# Patient Record
Sex: MALE | Race: BLACK OR AFRICAN AMERICAN | Employment: FULL TIME | URBAN - METROPOLITAN AREA
[De-identification: names, ages, dates, MRNs, and addresses within clinical notes are randomized per-mention and may not be internally consistent; named-entity substitution may affect disease eponyms.]

---

## 2024-01-12 ENCOUNTER — HOSPITAL ENCOUNTER (EMERGENCY)
Facility: HOSPITAL | Age: 49
Discharge: HOME | End: 2024-01-12
Attending: EMERGENCY MEDICINE
Payer: MEDICAID

## 2024-01-12 ENCOUNTER — APPOINTMENT (OUTPATIENT)
Dept: CT IMAGING | Facility: HOSPITAL | Age: 49
End: 2024-01-12
Attending: EMERGENCY MEDICINE
Payer: MEDICAID

## 2024-01-12 VITALS
SYSTOLIC BLOOD PRESSURE: 106 MMHG | HEART RATE: 66 BPM | HEIGHT: 66 IN | DIASTOLIC BLOOD PRESSURE: 66 MMHG | BODY MASS INDEX: 26.2 KG/M2 | TEMPERATURE: 98 F | OXYGEN SATURATION: 94 % | RESPIRATION RATE: 18 BRPM | WEIGHT: 163 LBS

## 2024-01-12 DIAGNOSIS — J18.9 COMMUNITY ACQUIRED PNEUMONIA OF RIGHT LOWER LOBE OF LUNG: ICD-10-CM

## 2024-01-12 DIAGNOSIS — S20.211A CONTUSION OF RIB ON RIGHT SIDE, INITIAL ENCOUNTER: ICD-10-CM

## 2024-01-12 DIAGNOSIS — J40 BRONCHITIS: Primary | ICD-10-CM

## 2024-01-12 LAB
ALBUMIN SERPL-MCNC: 4 G/DL (ref 3.4–5)
ALBUMIN/GLOB SERPL: 1.1 {RATIO} (ref 1–2)
ALP LIVER SERPL-CCNC: 79 U/L
ALT SERPL-CCNC: 20 U/L
ANION GAP SERPL CALC-SCNC: 8 MMOL/L (ref 0–18)
AST SERPL-CCNC: 13 U/L (ref 15–37)
BASOPHILS # BLD AUTO: 0.04 X10(3) UL (ref 0–0.2)
BASOPHILS NFR BLD AUTO: 0.5 %
BILIRUB SERPL-MCNC: 0.2 MG/DL (ref 0.1–2)
BUN BLD-MCNC: 11 MG/DL (ref 9–23)
CALCIUM BLD-MCNC: 9.3 MG/DL (ref 8.5–10.1)
CHLORIDE SERPL-SCNC: 108 MMOL/L (ref 98–112)
CO2 SERPL-SCNC: 23 MMOL/L (ref 21–32)
CREAT BLD-MCNC: 0.93 MG/DL
D DIMER PPP FEU-MCNC: 0.63 UG/ML FEU (ref ?–0.5)
EGFRCR SERPLBLD CKD-EPI 2021: 100 ML/MIN/1.73M2 (ref 60–?)
EOSINOPHIL # BLD AUTO: 0.02 X10(3) UL (ref 0–0.7)
EOSINOPHIL NFR BLD AUTO: 0.2 %
ERYTHROCYTE [DISTWIDTH] IN BLOOD BY AUTOMATED COUNT: 14.4 %
GLOBULIN PLAS-MCNC: 3.7 G/DL (ref 2.8–4.4)
GLUCOSE BLD-MCNC: 83 MG/DL (ref 70–99)
HCT VFR BLD AUTO: 39.4 %
HGB BLD-MCNC: 13.5 G/DL
IMM GRANULOCYTES # BLD AUTO: 0.01 X10(3) UL (ref 0–1)
IMM GRANULOCYTES NFR BLD: 0.1 %
LYMPHOCYTES # BLD AUTO: 2.16 X10(3) UL (ref 1–4)
LYMPHOCYTES NFR BLD AUTO: 25.8 %
MCH RBC QN AUTO: 32.5 PG (ref 26–34)
MCHC RBC AUTO-ENTMCNC: 34.3 G/DL (ref 31–37)
MCV RBC AUTO: 94.7 FL
MONOCYTES # BLD AUTO: 0.44 X10(3) UL (ref 0.1–1)
MONOCYTES NFR BLD AUTO: 5.3 %
NEUTROPHILS # BLD AUTO: 5.7 X10 (3) UL (ref 1.5–7.7)
NEUTROPHILS # BLD AUTO: 5.7 X10(3) UL (ref 1.5–7.7)
NEUTROPHILS NFR BLD AUTO: 68.1 %
OSMOLALITY SERPL CALC.SUM OF ELEC: 287 MOSM/KG (ref 275–295)
PLATELET # BLD AUTO: 352 10(3)UL (ref 150–450)
POTASSIUM SERPL-SCNC: 3.9 MMOL/L (ref 3.5–5.1)
PROT SERPL-MCNC: 7.7 G/DL (ref 6.4–8.2)
RBC # BLD AUTO: 4.16 X10(6)UL
SODIUM SERPL-SCNC: 139 MMOL/L (ref 136–145)
WBC # BLD AUTO: 8.4 X10(3) UL (ref 4–11)

## 2024-01-12 PROCEDURE — 99285 EMERGENCY DEPT VISIT HI MDM: CPT

## 2024-01-12 PROCEDURE — 85025 COMPLETE CBC W/AUTO DIFF WBC: CPT | Performed by: EMERGENCY MEDICINE

## 2024-01-12 PROCEDURE — 85379 FIBRIN DEGRADATION QUANT: CPT | Performed by: EMERGENCY MEDICINE

## 2024-01-12 PROCEDURE — 80053 COMPREHEN METABOLIC PANEL: CPT | Performed by: EMERGENCY MEDICINE

## 2024-01-12 PROCEDURE — 71275 CT ANGIOGRAPHY CHEST: CPT | Performed by: EMERGENCY MEDICINE

## 2024-01-12 PROCEDURE — 99284 EMERGENCY DEPT VISIT MOD MDM: CPT

## 2024-01-12 PROCEDURE — 96374 THER/PROPH/DIAG INJ IV PUSH: CPT

## 2024-01-12 PROCEDURE — 96375 TX/PRO/DX INJ NEW DRUG ADDON: CPT

## 2024-01-12 RX ORDER — IBUPROFEN 600 MG/1
600 TABLET ORAL EVERY 8 HOURS PRN
Qty: 30 TABLET | Refills: 0 | Status: SHIPPED | OUTPATIENT
Start: 2024-01-12 | End: 2024-01-22

## 2024-01-12 RX ORDER — KETOROLAC TROMETHAMINE 15 MG/ML
15 INJECTION, SOLUTION INTRAMUSCULAR; INTRAVENOUS ONCE
Status: COMPLETED | OUTPATIENT
Start: 2024-01-12 | End: 2024-01-12

## 2024-01-12 RX ORDER — AZITHROMYCIN 250 MG/1
TABLET, FILM COATED ORAL
Qty: 6 TABLET | Refills: 0 | Status: SHIPPED | OUTPATIENT
Start: 2024-01-12 | End: 2024-01-17

## 2024-01-12 RX ORDER — IOHEXOL 350 MG/ML
100 INJECTION, SOLUTION INTRAVENOUS
Status: COMPLETED | OUTPATIENT
Start: 2024-01-12 | End: 2024-01-12

## 2024-01-12 RX ORDER — HYDROCODONE BITARTRATE AND ACETAMINOPHEN 5; 325 MG/1; MG/1
1-2 TABLET ORAL EVERY 6 HOURS PRN
Qty: 10 TABLET | Refills: 0 | Status: SHIPPED | OUTPATIENT
Start: 2024-01-12 | End: 2024-01-17

## 2024-01-12 RX ORDER — MORPHINE SULFATE 4 MG/ML
4 INJECTION, SOLUTION INTRAMUSCULAR; INTRAVENOUS ONCE
Status: COMPLETED | OUTPATIENT
Start: 2024-01-12 | End: 2024-01-12

## 2024-01-13 NOTE — ED PROVIDER NOTES
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  States he has significant shortness of breath when he tries to take a deep breath and it hurts.  Went to immediate care they were concerned due to the shortness of breath so sent him here for further evaluation.  Denies any chest pain just pain and has been coughing up yellow-green sputum    Objective:   History reviewed. No pertinent past medical history.           History reviewed. No pertinent surgical history.             No pertinent social history.            Review of Systems    Positive for stated complaint: sent from [facility] had imaging done  Other systems are as noted in HPI.  Constitutional and vital signs reviewed.      All other systems reviewed and negative except as noted above.    Physical Exam     ED Triage Vitals [01/12/24 2049]   /77   Pulse 77   Resp 20   Temp 98.1 °F (36.7 °C)   Temp src Temporal   SpO2 96 %   O2 Device None (Room air)       Current:/66   Pulse 66   Temp 98.1 °F (36.7 °C) (Temporal)   Resp 18   Ht 167.6 cm (5' 6\")   Wt 73.9 kg   SpO2 94%   BMI 26.31 kg/m²         Physical Exam      Vital signs reviewed  General appearance: Patient is alert and in moderate pain distress  HEENT: Pupils equal react to light extraocular muscles intact no scleral icterus, mucous membranes are moist, there is no erythema or exudate in the posterior pharynx  Neck: Supple no JVD no lymphadenopathy no meningismus no carotid bruit  CV: Regular rate and rhythm no murmur rub  Respiratory: Patient has some coarse breath sounds with some scattered wheezes and rhonchi also point tender over right  lateral rib  Abdomen: Soft nontender nondistended, no rebound no guarding  no hepatosplenomegaly bowel sounds are present , no pulsatile mass  Extremities: No clubbing cyanosis or edema 2+ distal pulses.  Neuro: Cranial nerves II through XII intact with no gross focal sensory or motor abnormality.      ED Course     Labs Reviewed   COMP METABOLIC PANEL (14) - Abnormal; Notable for the following components:       Result Value    AST 13 (*)     All other components within normal limits   D-DIMER - Abnormal; Notable for the following components:    D-Dimer 0.63 (*)     All other components within normal limits   CBC W/ DIFFERENTIAL - Abnormal; Notable for the following components:    RBC 4.16 (*)     All other components within normal limits   CBC WITH DIFFERENTIAL WITH PLATELET    Narrative:     The following orders were created for panel order CBC With Differential With Platelet.  Procedure                               Abnormality         Status                     ---------                               -----------         ------                     CBC W/ DIFFERENTIAL[[id number]]          Abnormal            Final result                 Please view results for these tests on the individual orders.   RAINBOW DRAW LAVENDER   RAINBOW DRAW LIGHT GREEN   RAINBOW DRAW BLUE             CT ANGIOGRAPHY, CHEST (CPT=71275)    Result Date: 1/12/2024  CONCLUSION:   1. No CT evidence of acute pulmonary embolism or acute aortic dissection.  2. Mild scattered atelectasis/scarring in the bilateral lungs.  There is moderate diffuse bronchial wall thickening, mucous plugging, and subtle centrilobular micro nodularity noted in the lungs that could be due to bronchitis or viral pneumonitis, with developing bronchopneumonia or other etiologies not entirely excluded.  Clinical correlation recommended along with follow-up until complete resolution.  3. Mild emphysematous changes in the lungs.  4. Changes of old granulomatous disease.    Please see above for further details.  LOCATION:  [facility]   Dictated by (CST): Anthony Walker MD on 1/12/2024 at 11:18 PM     Finalized by (HARSHAL): Anthony Walker MD on 1/12/2024 at 11:20 PM      Patient had baseline labs drawn along with a D-dimer which was slightly elevated.  Do feel probably    Low yield but since it is elevated we will get a CT scan just to make sure no obvious fracture or pulmonary embolism.    CT scan did show diffuse bronchial wall thickening mucous plugging that could be significant bronchitis or developing bronchopneumonia.  Patient will be prescribed a Z-Justin along with some pain medication and Mucinex.  Told him to follow-up with primary return if worse         MDM      Differential diagnosis reflecting the complexity of care include: Rib fracture, pulmonary embolism, rib contusion, bronchitis, pneumonia      External chart review was done and was noted: Immediate care note was reviewed and since they did not have ability to D-dimer was sent here for further evaluation    My independent interpretation of studies of: The scan showed no acute rib fracture.  No pulmonary embolism.  Does appear to have may be developed developing early pneumonia    Shared decision making was done by self and patient.  Patient will be discharged home.  Follow-up with primary        Patient was screened and evaluated during this visit.  As the treating physician attending to the patient, I determined within reasonable clinical confidence and prior to discharge, that an emergency medical condition was not or was no longer present.  There was no indication for further evaluation, treatment, or admission on an emergency basis.  Comprehensive verbal and written discharge and follow-up instructions were provided to help prevent relapse or worsening.  Patient was instructed to follow-up with primary care provider for further evaluation treatment, return immediately to ER for worsening, concerning, new, or  changing/persisting symptoms.  I discussed the case with the patient and they had no questions, complaints, or concerns.  Patient was comfortable going home.      Dictation Disclaimer Note:   To increase efficiency this document may have been prepared using voice recognition technology. Every effort has been made to correct any errors made during preparation of this note. However, if a word or phrase is confusing, or does not make sense, this is likely due to a recognition error within the program which was not discovered during editing. Please do not hesitate to contact to address any significant errors.    Note to Patient:   The 21st Century Cures Act makes medical notes like these available to patients in the interest of transparency. Please be advised this is a medical document. Medical documents are intended to carry relevant information, facts as evident, and the clinical opinion of the practitioner. The medical note is intended as peer to peer communication and may appear blunt or direct. It is written in medical language and may contain abbreviations or verbiage that are unfamiliar.                                  Medical Decision Making      Disposition and Plan     Clinical Impression:  1. Bronchitis    2. Contusion of rib on right side, initial encounter    3. Community acquired pneumonia of right lower lobe of lung         Disposition:  Discharge  1/12/2024 11:25 pm    Follow-up:  Your PCP    Follow up            Medications Prescribed:  Current Discharge Medication List        START taking these medications    Details   azithromycin (ZITHROMAX Z-FOREST) 250 MG Oral Tab Take 2 tablets (500 mg total) by mouth daily for 1 day, THEN 1 tablet (250 mg total) daily for 4 days.  Qty: 6 tablet, Refills: 0      ibuprofen 600 MG Oral Tab Take 1 tablet (600 mg total) by mouth every 8 (eight) hours as needed for Pain.  Qty: 30 tablet, Refills: 0      HYDROcodone-acetaminophen 5-325 MG Oral Tab Take 1-2 tablets by  mouth every 6 (six) hours as needed for Pain.  Qty: 10 tablet, Refills: 0    Associated Diagnoses: Contusion of rib on right side, initial encounter
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
## 2024-03-26 ENCOUNTER — APPOINTMENT (OUTPATIENT)
Dept: CT IMAGING | Facility: HOSPITAL | Age: 49
End: 2024-03-26
Attending: EMERGENCY MEDICINE
Payer: MEDICAID

## 2024-03-26 ENCOUNTER — HOSPITAL ENCOUNTER (EMERGENCY)
Facility: HOSPITAL | Age: 49
Discharge: LEFT BEFORE DISCHARGE | End: 2024-03-26
Attending: EMERGENCY MEDICINE
Payer: MEDICAID

## 2024-03-26 VITALS
OXYGEN SATURATION: 98 % | DIASTOLIC BLOOD PRESSURE: 74 MMHG | TEMPERATURE: 98 F | WEIGHT: 170 LBS | SYSTOLIC BLOOD PRESSURE: 112 MMHG | HEART RATE: 69 BPM | BODY MASS INDEX: 27.32 KG/M2 | RESPIRATION RATE: 16 BRPM | HEIGHT: 66 IN

## 2024-03-26 DIAGNOSIS — S02.85XA CLOSED FRACTURE OF RIGHT ORBIT, INITIAL ENCOUNTER (HCC): ICD-10-CM

## 2024-03-26 DIAGNOSIS — S09.90XA INJURY OF HEAD, INITIAL ENCOUNTER: ICD-10-CM

## 2024-03-26 DIAGNOSIS — Z53.29 LEFT AGAINST MEDICAL ADVICE: Primary | ICD-10-CM

## 2024-03-26 LAB
ALBUMIN SERPL-MCNC: 4.1 G/DL (ref 3.4–5)
ALBUMIN/GLOB SERPL: 1.1 {RATIO} (ref 1–2)
ALP LIVER SERPL-CCNC: 87 U/L
ALT SERPL-CCNC: 52 U/L
ANION GAP SERPL CALC-SCNC: 4 MMOL/L (ref 0–18)
AST SERPL-CCNC: 52 U/L (ref 15–37)
BASOPHILS # BLD AUTO: 0.05 X10(3) UL (ref 0–0.2)
BASOPHILS NFR BLD AUTO: 0.6 %
BILIRUB SERPL-MCNC: 0.6 MG/DL (ref 0.1–2)
BUN BLD-MCNC: 17 MG/DL (ref 9–23)
CALCIUM BLD-MCNC: 9.5 MG/DL (ref 8.5–10.1)
CHLORIDE SERPL-SCNC: 105 MMOL/L (ref 98–112)
CO2 SERPL-SCNC: 30 MMOL/L (ref 21–32)
CREAT BLD-MCNC: 1.34 MG/DL
EGFRCR SERPLBLD CKD-EPI 2021: 64 ML/MIN/1.73M2 (ref 60–?)
EOSINOPHIL # BLD AUTO: 0.08 X10(3) UL (ref 0–0.7)
EOSINOPHIL NFR BLD AUTO: 0.9 %
ERYTHROCYTE [DISTWIDTH] IN BLOOD BY AUTOMATED COUNT: 13.5 %
GLOBULIN PLAS-MCNC: 3.8 G/DL (ref 2.8–4.4)
GLUCOSE BLD-MCNC: 130 MG/DL (ref 70–99)
HCT VFR BLD AUTO: 40.6 %
HGB BLD-MCNC: 14.2 G/DL
IMM GRANULOCYTES # BLD AUTO: 0.01 X10(3) UL (ref 0–1)
IMM GRANULOCYTES NFR BLD: 0.1 %
LYMPHOCYTES # BLD AUTO: 2.37 X10(3) UL (ref 1–4)
LYMPHOCYTES NFR BLD AUTO: 26.4 %
MCH RBC QN AUTO: 32.5 PG (ref 26–34)
MCHC RBC AUTO-ENTMCNC: 35 G/DL (ref 31–37)
MCV RBC AUTO: 92.9 FL
MONOCYTES # BLD AUTO: 0.59 X10(3) UL (ref 0.1–1)
MONOCYTES NFR BLD AUTO: 6.6 %
NEUTROPHILS # BLD AUTO: 5.87 X10 (3) UL (ref 1.5–7.7)
NEUTROPHILS # BLD AUTO: 5.87 X10(3) UL (ref 1.5–7.7)
NEUTROPHILS NFR BLD AUTO: 65.4 %
OSMOLALITY SERPL CALC.SUM OF ELEC: 291 MOSM/KG (ref 275–295)
PLATELET # BLD AUTO: 242 10(3)UL (ref 150–450)
POTASSIUM SERPL-SCNC: 3.8 MMOL/L (ref 3.5–5.1)
PROT SERPL-MCNC: 7.9 G/DL (ref 6.4–8.2)
RBC # BLD AUTO: 4.37 X10(6)UL
SODIUM SERPL-SCNC: 139 MMOL/L (ref 136–145)
WBC # BLD AUTO: 9 X10(3) UL (ref 4–11)

## 2024-03-26 PROCEDURE — 96374 THER/PROPH/DIAG INJ IV PUSH: CPT

## 2024-03-26 PROCEDURE — 80053 COMPREHEN METABOLIC PANEL: CPT | Performed by: EMERGENCY MEDICINE

## 2024-03-26 PROCEDURE — 99285 EMERGENCY DEPT VISIT HI MDM: CPT

## 2024-03-26 PROCEDURE — 70450 CT HEAD/BRAIN W/O DYE: CPT | Performed by: EMERGENCY MEDICINE

## 2024-03-26 PROCEDURE — 99284 EMERGENCY DEPT VISIT MOD MDM: CPT

## 2024-03-26 PROCEDURE — 72125 CT NECK SPINE W/O DYE: CPT | Performed by: EMERGENCY MEDICINE

## 2024-03-26 PROCEDURE — 70486 CT MAXILLOFACIAL W/O DYE: CPT | Performed by: EMERGENCY MEDICINE

## 2024-03-26 PROCEDURE — 85025 COMPLETE CBC W/AUTO DIFF WBC: CPT | Performed by: EMERGENCY MEDICINE

## 2024-03-26 RX ORDER — KETOROLAC TROMETHAMINE 30 MG/ML
30 INJECTION, SOLUTION INTRAMUSCULAR; INTRAVENOUS ONCE
Status: COMPLETED | OUTPATIENT
Start: 2024-03-26 | End: 2024-03-26

## 2024-03-26 RX ORDER — AMOXICILLIN AND CLAVULANATE POTASSIUM 875; 125 MG/1; MG/1
1 TABLET, FILM COATED ORAL 2 TIMES DAILY
Qty: 20 TABLET | Refills: 0 | Status: SHIPPED | OUTPATIENT
Start: 2024-03-26 | End: 2024-04-05

## 2024-03-26 RX ORDER — BUTALBITAL, ACETAMINOPHEN AND CAFFEINE 50; 325; 40 MG/1; MG/1; MG/1
1 TABLET ORAL EVERY 4 HOURS PRN
Status: DISCONTINUED | OUTPATIENT
Start: 2024-03-26 | End: 2024-03-26

## 2024-03-26 RX ORDER — HYDROCODONE BITARTRATE AND ACETAMINOPHEN 5; 325 MG/1; MG/1
1-2 TABLET ORAL EVERY 6 HOURS PRN
Qty: 10 TABLET | Refills: 0 | Status: SHIPPED | OUTPATIENT
Start: 2024-03-26 | End: 2024-03-31

## 2024-03-26 RX ORDER — BUTALBITAL, ACETAMINOPHEN AND CAFFEINE 50; 325; 40 MG/1; MG/1; MG/1
1 TABLET ORAL ONCE
Status: DISCONTINUED | OUTPATIENT
Start: 2024-03-26 | End: 2024-03-26

## 2024-03-26 RX ORDER — BUTALBITAL, ACETAMINOPHEN AND CAFFEINE 50; 325; 40 MG/1; MG/1; MG/1
2 TABLET ORAL ONCE
Qty: 2 TABLET | Refills: 0 | Status: COMPLETED | OUTPATIENT
Start: 2024-03-26 | End: 2024-03-26

## 2024-03-26 NOTE — ED QUICK NOTES
Patient given Toradol IV for pain. Patient appears very sleepy and wife states he has not slept for 2 days and is not acting himself. Patient is alert and oriented to time, place, and person, but slow to respond.

## 2024-03-26 NOTE — ED QUICK NOTES
PD present at time of discharge. Patient given written and verbal discharge instructions, patient aware he is leaving MD LEONORA aware.
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
## (undated) NOTE — LETTER
Date & Time: 3/26/2024, 8:00 AM  Patient: Marcus Bajwa  Encounter Provider(s):    Karis Kaur, DO         This certifies that I, Marcus Bajwa, a patient at an [facility], am leaving the facility voluntarily and against the advice of my physician.    I acknowledge that I have been:    1. informed that my physician believes that I need to receive care here;  2. informed that if I leave, I could become sicker or even die; and  3. provided discharge instructions consistent with my current diagnosis.    I hereby release my physician, the facility, and its employees from all responsibility for any ill effects which may result from this action.        __________________________________  Patient or authorized caregiver signature    __________________________________  RN signature    If no patient or patient representative signature was obtained, sign below to acknowledge that the form was reviewed with the patient and that the patient refused to sign.    __________________________________  RN signature

## (undated) NOTE — LETTER
Date & Time: 3/26/2024, 7:43 AM  Patient: Marcus   Encounter Provider(s):    Karis Kaur, DO          I have seen Marcus Bajwa 11/17/1973 and found him medically cleared for incarceration with [facility].        _____________________________  Physician